# Patient Record
Sex: FEMALE | Race: WHITE | NOT HISPANIC OR LATINO | Employment: OTHER | URBAN - METROPOLITAN AREA
[De-identification: names, ages, dates, MRNs, and addresses within clinical notes are randomized per-mention and may not be internally consistent; named-entity substitution may affect disease eponyms.]

---

## 2017-01-27 ENCOUNTER — HOSPITAL ENCOUNTER (OUTPATIENT)
Dept: SLEEP MEDICINE | Facility: HOSPITAL | Age: 77
Discharge: HOME | End: 2017-01-27
Attending: INTERNAL MEDICINE

## 2017-01-27 ENCOUNTER — HOSPITAL ENCOUNTER (OUTPATIENT)
Dept: SLEEP MEDICINE | Facility: HOSPITAL | Age: 77
Discharge: HOME | End: 2017-01-27
Admitting: NURSE PRACTITIONER

## 2017-01-27 PROCEDURE — G0463 HOSPITAL OUTPT CLINIC VISIT: HCPCS

## 2017-01-28 NOTE — PROGRESS NOTES
DATE OF VISIT:  01//27/2017    PRIMARY CARE PHYSICIAN: Ashlee Stevenson MD    I had the pleasure of seeing the patient in the office today. Below, please find summary report for pertinent findings and conclusions.      HISTORY: The patient is a very pleasant 76-year-old female who was last seen in this office in January 2016.  She is here today for an annual followup. She recently travelled to [place] and got a new nasal mask type there. She loves it. It has less cumbersome head gear. She is able to sleep properly with it. She did not bring the mask here today to show us, though she loves this current mask type. She has not changed her filters in quite some time now.     She goes to bed between 10 and 11 p.m. and is up between 8 and 10 a.m. She gets 6-7 hours of sleep per night.  Her Silas Sleepiness Scale score today is 2. She uses a nasal mask. It fits well. Her DME is [employer].     REVIEW OF SYSTEMS: Significant for postnasal drip and congestion with sneezing at night.     CURRENT MEDICATIONS:   1. Aspirin.    2. Metoprolol.    3. Vitamin D and C.    SOCIAL HISTORY: No tobacco. Drinks 1 caffeinated beverage a day. No energy drinks.     PHYSICAL EXAMINATION:  VITAL SIGNS: Height 5 feet 1 inch inches, weight 177 pounds, BMI 33.  Blood pressure 167/80, heart rate 65.  HEENT:  Class II Mallampati airway. Large-size tongue.  LUNGS: Respiratory effort nonlabored. Lungs clear to auscultation bilaterally.   HEART: Regular rate and rhythm. No murmurs or rubs.   ABDOMEN: Soft, nontender. Bowel sounds are present.   EXTREMITIES: No evidence of edema. Pedal pulses positive.     DIAGNOSTIC DATA: Testing download dates 07/31/2016 through 01/26/2017 shows 98% compliance with average use of 8 hours 30 minutes and CPAP pressure of 9 and AHI of 3.     ASSESSMENT:  1. Obstructive sleep apnea.   2. Diabetes mellitus.  3. Hypertension.     PLAN:   1. The patient received her CPAP device approximately the end of  October/early November 2012.  She will be eligible for a new device around November 2017.  Her current device is working properly. She will follow up with us the end of October to reevaluate the need for a new machine. We will order her a new device at next visit. I asked her to change CPAP accessories at regular intervals for optimal benefit. She is currently doing very well.   2. She will follow up with Dr. Haja Shaver at the end of October 2016.    I appreciate the opportunity to participate in this patient’s care.      TAMMY Valdivia  [email or web address]  D:   01/27/2017 13:58:22  T:   01/28/2017 08:00:00  Job ID:   [id number]  Document ID:   [id number]  cc:   Ashlee Stevenson M.D.

## 2017-10-13 ENCOUNTER — HOSPITAL ENCOUNTER (OUTPATIENT)
Dept: SLEEP MEDICINE | Facility: HOSPITAL | Age: 77
Discharge: HOME | End: 2017-10-13

## 2018-06-13 ENCOUNTER — APPOINTMENT (OUTPATIENT)
Dept: WOMENS IMAGING | Facility: HOSPITAL | Age: 78
End: 2018-06-13

## 2018-06-13 PROCEDURE — 77063 BREAST TOMOSYNTHESIS BI: CPT | Performed by: RADIOLOGY

## 2018-06-13 PROCEDURE — 77067 SCR MAMMO BI INCL CAD: CPT | Performed by: RADIOLOGY

## 2018-06-13 PROCEDURE — MDREVIEWSP: Performed by: RADIOLOGY

## 2019-01-22 ENCOUNTER — APPOINTMENT (OUTPATIENT)
Dept: WOMENS IMAGING | Facility: HOSPITAL | Age: 79
End: 2019-01-22

## 2019-01-22 PROCEDURE — 76830 TRANSVAGINAL US NON-OB: CPT | Performed by: RADIOLOGY

## 2019-03-25 ENCOUNTER — TRANSCRIBE ORDERS (OUTPATIENT)
Dept: PHYSICAL THERAPY | Facility: HOSPITAL | Age: 79
End: 2019-03-25

## 2019-03-25 DIAGNOSIS — M17.9 OSTEOARTHRITIS OF KNEE, UNSPECIFIED LATERALITY, UNSPECIFIED OSTEOARTHRITIS TYPE: Primary | ICD-10-CM

## 2019-03-25 DIAGNOSIS — M25.569 KNEE PAIN, UNSPECIFIED CHRONICITY, UNSPECIFIED LATERALITY: ICD-10-CM

## 2023-01-23 ENCOUNTER — TRANSCRIBE ORDERS (OUTPATIENT)
Dept: PHYSICAL THERAPY | Facility: CLINIC | Age: 83
End: 2023-01-23
Payer: MEDICARE

## 2023-01-23 DIAGNOSIS — M25.561 RIGHT KNEE PAIN, UNSPECIFIED CHRONICITY: Primary | ICD-10-CM
